# Patient Record
Sex: MALE | Race: WHITE | NOT HISPANIC OR LATINO | Employment: FULL TIME | URBAN - METROPOLITAN AREA
[De-identification: names, ages, dates, MRNs, and addresses within clinical notes are randomized per-mention and may not be internally consistent; named-entity substitution may affect disease eponyms.]

---

## 2018-04-02 ENCOUNTER — HOSPITAL ENCOUNTER (EMERGENCY)
Facility: MEDICAL CENTER | Age: 23
End: 2018-04-02
Attending: EMERGENCY MEDICINE
Payer: OTHER MISCELLANEOUS

## 2018-04-02 VITALS
OXYGEN SATURATION: 100 % | DIASTOLIC BLOOD PRESSURE: 73 MMHG | SYSTOLIC BLOOD PRESSURE: 122 MMHG | BODY MASS INDEX: 29.98 KG/M2 | RESPIRATION RATE: 14 BRPM | HEIGHT: 69 IN | TEMPERATURE: 98.8 F | WEIGHT: 202.38 LBS | HEART RATE: 77 BPM

## 2018-04-02 DIAGNOSIS — L02.91 ABSCESS: ICD-10-CM

## 2018-04-02 PROCEDURE — 99283 EMERGENCY DEPT VISIT LOW MDM: CPT

## 2018-04-02 RX ORDER — DESMOPRESSIN ACETATE 0.2 MG/1
0.2 TABLET ORAL 3 TIMES DAILY
COMMUNITY
End: 2019-06-23

## 2018-04-02 RX ORDER — OLANZAPINE 5 MG/1
20 TABLET ORAL 2 TIMES DAILY
COMMUNITY
End: 2020-08-29

## 2018-04-02 RX ORDER — CLINDAMYCIN HYDROCHLORIDE 300 MG/1
300 CAPSULE ORAL 4 TIMES DAILY
Qty: 28 CAP | Refills: 0 | Status: SHIPPED | OUTPATIENT
Start: 2018-04-02 | End: 2018-04-09

## 2018-04-02 RX ORDER — OXYBUTYNIN CHLORIDE 5 MG/1
2.5 TABLET ORAL DAILY
COMMUNITY
End: 2019-08-21

## 2018-04-02 ASSESSMENT — LIFESTYLE VARIABLES: DO YOU DRINK ALCOHOL: NO

## 2018-04-02 NOTE — ED NOTES
Discharge instructions provided to pt. Copy of instructions and rx x1 provided to pt. Verbalized understanding. Instructed to follow up with PCP or return to ed with worsening symptoms. Educated on worsening symptoms. Educated on hygiene. Educated on pain management. Pt discharged to home. PT ambulated out of ED. No questions or concerns.

## 2018-04-02 NOTE — ED PROVIDER NOTES
"ED Provider Note    CHIEF COMPLAINT  Chief Complaint   Patient presents with   • Lump     under R arm, first noticed it Friday, today it's larger and more painful, not draining anything       HPI  Bert Bravo is a 23 y.o. male who presents for evaluation of a lump under his right arm. He 1st noticed it a couple of days ago. States it's a little bit larger today and more uncomfortable so he is coming in for evaluation. He's had no fevers. Has had no drainage.    REVIEW OF SYSTEMS  See HPI for further details. All other systems are negative.     PAST MEDICAL HISTORY  History reviewed. No pertinent past medical history.    FAMILY HISTORY  History reviewed. No pertinent family history.    SOCIAL HISTORY  Social History     Social History   • Marital status: N/A     Spouse name: N/A   • Number of children: N/A   • Years of education: N/A     Social History Main Topics   • Smoking status: Current Every Day Smoker     Packs/day: 1.00     Types: Cigarettes   • Smokeless tobacco: Never Used   • Alcohol use No   • Drug use: No   • Sexual activity: Not on file     Other Topics Concern   • Not on file     Social History Narrative   • No narrative on file       SURGICAL HISTORY  Past Surgical History:   Procedure Laterality Date   • OTHER ORTHOPEDIC SURGERY  2009    R knee cap       CURRENT MEDICATIONS  Home Medications     Reviewed by Payton Owens R.N. (Registered Nurse) on 04/02/18 at 0809  Med List Status: Complete   Medication Last Dose Status   desmopressin (DDAVP) 0.2 MG tablet 4/2/2018 Active   OLANZapine (ZYPREXA) 5 MG Tab 4/2/2018 Active   oxybutynin (DITROPAN) 5 MG Tab 4/2/2018 Active                ALLERGIES  No Known Allergies    PHYSICAL EXAM  VITAL SIGNS: /66   Pulse 81   Temp 36.6 °C (97.9 °F)   Resp 17   Ht 1.753 m (5' 9\")   Wt 91.8 kg (202 lb 6.1 oz)   SpO2 100%   BMI 29.89 kg/m²     Constitutional: Well developed, Well nourished, No acute distress, Non-toxic appearance.   HENT: " Normocephalic, Atraumatic.   Cardiovascular: Normal heart rate.   Thorax & Lungs: No respiratory distress.  Skin: Warm, Dry.   Musculoskeletal: Right axilla there is a firm erythematous area less than 1 cm in diameter. There is no fluctuance. There is slight tenderness.  Neurologic: Awake alert.    COURSE & MEDICAL DECISION MAKING  Pertinent Labs & Imaging studies reviewed. (See chart for details)  This 23-year-old here for evaluation of a lump in his right axilla. On exam he does have a small area of swelling. This may represent a very small abscess that I do not think this requires drainage and it should respond to oral antibiotics. I will place him on clindamycin. He'll return here for any worsening symptoms. He is given a discharge instruction sheet on abscesses.    FINAL IMPRESSION  1. Right axillary abscess  2.   3.         Electronically signed by: Aaron Bruno, 4/2/2018 8:50 AM

## 2018-04-02 NOTE — DISCHARGE INSTRUCTIONS
Skin Abscess  A skin abscess is an infected area on or under your skin that contains a collection of pus and other material. An abscess may also be called a furuncle, carbuncle, or boil. An abscess can occur in or on almost any part of your body.  Some abscesses break open (rupture) on their own. Most continue to get worse unless they are treated. The infection can spread deeper into the body and eventually into your blood, which can make you feel ill. Treatment usually involves draining the abscess.  What are the causes?  An abscess occurs when germs, often bacteria, pass through your skin and cause an infection. This may be caused by:  · A scrape or cut on your skin.  · A puncture wound through your skin, including a needle injection.  · Blocked oil or sweat glands.  · Blocked and infected hair follicles.  · A cyst that forms beneath your skin (sebaceous cyst) and becomes infected.  What increases the risk?  This condition is more likely to develop in people who:  · Have a weak body defense system (immune system).  · Have diabetes.  · Have dry and irritated skin.  · Get frequent injections or use illegal IV drugs.  · Have a foreign body in a wound, such as a splinter.  · Have problems with their lymph system or veins.  What are the signs or symptoms?  An abscess may start as a painful, firm bump under the skin. Over time, the abscess may get larger or become softer. Pus may appear at the top of the abscess, causing pressure and pain. It may eventually break through the skin and drain. Other symptoms include:  · Redness.  · Warmth.  · Swelling.  · Tenderness.  · A sore on the skin.  How is this diagnosed?  This condition is diagnosed based on your medical history and a physical exam. A sample of pus may be taken from the abscess to find out what is causing the infection and what antibiotics can be used to treat it. You also may have:  · Blood tests to look for signs of infection or spread of an infection to your  blood.  · Imaging studies such as ultrasound, CT scan, or MRI if the abscess is deep.  How is this treated?  Small abscesses that drain on their own may not need treatment. Treatment for an abscess that does not rupture on its own may include:  · Warm compresses applied to the area several times per day.  · Incision and drainage. Your health care provider will make an incision to open the abscess and will remove pus and any foreign body or dead tissue. The incision area may be packed with gauze to keep it open for a few days while it heals.  · Antibiotic medicines to treat infection. For a severe abscess, you may first get antibiotics through an IV and then change to oral antibiotics.  Follow these instructions at home:  Abscess Care  · If you have an abscess that has not drained, place a warm, clean, wet washcloth over the abscess several times a day. Do this as told by your health care provider.  · Follow instructions from your health care provider about how to take care of your abscess. Make sure you:  ¨ Cover the abscess with a bandage (dressing).  ¨ Change your dressing or gauze as told by your health care provider.  ¨ Wash your hands with soap and water before you change the dressing or gauze. If soap and water are not available, use hand .  · Check your abscess every day for signs of a worsening infection. Check for:  ¨ More redness, swelling, or pain.  ¨ More fluid or blood.  ¨ Warmth.  ¨ More pus or a bad smell.  Medicines  · Take over-the-counter and prescription medicines only as told by your health care provider.  · If you were prescribed an antibiotic medicine, take it as told by your health care provider. Do not stop taking the antibiotic even if you start to feel better.  General instructions  · To avoid spreading the infection:  ¨ Do not share personal care items, towels, or hot tubs with others.  ¨ Avoid making skin contact with other people.  · Keep all follow-up visits as told by your  health care provider. This is important.  Contact a health care provider if:  · You have more redness, swelling, or pain around your abscess.  · You have more fluid or blood coming from your abscess.  · Your abscess feels warm to the touch.  · You have more pus or a bad smell coming from your abscess.  · You have a fever.  · You have muscle aches.  · You have chills or a general ill feeling.  Get help right away if:  · You have severe pain.  · You see red streaks on your skin spreading away from the abscess.  This information is not intended to replace advice given to you by your health care provider. Make sure you discuss any questions you have with your health care provider.  Document Released: 09/27/2006 Document Revised: 08/13/2017 Document Reviewed: 10/26/2016  [employer] Interactive Patient Education © 2017 [employer]

## 2018-04-02 NOTE — ED TRIAGE NOTES
Ambulates to triage  Chief Complaint   Patient presents with   • Lump     under R arm, first noticed it Friday, today it's larger and more painful, not draining anything

## 2018-04-02 NOTE — ED NOTES
PT assessment complete. Agree with triage note. Pt c/o right axillary lump for 4 days. Pt has small amount of redness and increasing size/pain. No fevers. PT in gown. Educated on NPO status until cleared by MD. No needs. Will continue to monitor.

## 2018-08-15 ENCOUNTER — HOSPITAL ENCOUNTER (EMERGENCY)
Facility: MEDICAL CENTER | Age: 23
End: 2018-08-15
Attending: EMERGENCY MEDICINE
Payer: OTHER MISCELLANEOUS

## 2018-08-15 VITALS
SYSTOLIC BLOOD PRESSURE: 112 MMHG | BODY MASS INDEX: 28.24 KG/M2 | WEIGHT: 190.7 LBS | HEIGHT: 69 IN | TEMPERATURE: 98.6 F | DIASTOLIC BLOOD PRESSURE: 70 MMHG | HEART RATE: 80 BPM | OXYGEN SATURATION: 98 % | RESPIRATION RATE: 16 BRPM

## 2018-08-15 DIAGNOSIS — S01.81XA FACIAL LACERATION, INITIAL ENCOUNTER: ICD-10-CM

## 2018-08-15 PROCEDURE — 304999 HCHG REPAIR-SIMPLE/INTERMED LEVEL 1

## 2018-08-15 PROCEDURE — 99283 EMERGENCY DEPT VISIT LOW MDM: CPT

## 2018-08-15 PROCEDURE — 700101 HCHG RX REV CODE 250: Performed by: EMERGENCY MEDICINE

## 2018-08-15 PROCEDURE — 303353 HCHG DERMABOND SKIN ADHESIVE

## 2018-08-15 RX ADMIN — TETRACAINE HCL 3 ML: 10 INJECTION SUBARACHNOID at 13:33

## 2018-08-15 ASSESSMENT — PAIN SCALES - GENERAL: PAINLEVEL_OUTOF10: 0

## 2018-08-15 NOTE — ED NOTES
Discharge instructions given to pt including returning for signs and symptoms  Of infections.pt educated on the symptoms of infection. Questions answered by rn.  Discharged w/steady gait and stable vitals.

## 2018-08-15 NOTE — ED PROVIDER NOTES
"ED Provider Note    Scribed for Andrea Maher M.D. by Gabbie José. 8/15/2018, 1:44 PM.    Means of arrival: walk in   History obtained from: patient   History limited by: none     CHIEF COMPLAINT  Chief Complaint   Patient presents with   • T-5000 Lacerations       HPI  Bert Bravo is a 23 y.o. male who presents to the Emergency Department laceration over the left eyebrow.  He was at work on a ladder or something fell striking him on the face and he suffered a laceration came in for evaluation tetanus is up-to-date.    REVIEW OF SYSTEMS  ROS    PAST MEDICAL HISTORY   No pertinent past medical history     SURGICAL HISTORY   has a past surgical history that includes other orthopedic surgery (2009).    SOCIAL HISTORY  Social History   Substance Use Topics   • Smoking status: Current Every Day Smoker     Packs/day: 1.00     Types: Cigarettes   • Smokeless tobacco: Never Used   • Alcohol use No      History   Drug Use No       FAMILY HISTORY  History reviewed. No pertinent family history.    CURRENT MEDICATIONS  Home Medications     Reviewed by Ayanna Cornejo R.N. (Registered Nurse) on 08/15/18 at 1333  Med List Status: Partial   Medication Last Dose Status   desmopressin (DDAVP) 0.2 MG tablet 4/2/2018 Active   OLANZapine (ZYPREXA) 5 MG Tab 4/2/2018 Active   oxybutynin (DITROPAN) 5 MG Tab 4/2/2018 Active                ALLERGIES  No Known Allergies    PHYSICAL EXAM  VITAL SIGNS: /70   Pulse 80   Temp 37 °C (98.6 °F)   Resp 16   Ht 1.753 m (5' 9\")   Wt 86.5 kg (190 lb 11.2 oz)   SpO2 98%   BMI 28.16 kg/m²     Constitutional:   No acute distress  HENT: Patient has a 1 cm laceration just above the lateral aspect of the left eyebrow.  Moist mucous membranes    Laceration Repair Procedure Note    Patient's wound was anesthetized by topical let.  It was then blotted dry and Dermabond without complication    COURSE & MEDICAL DECISION MAKING  Pertinent Labs & Imaging studies reviewed. (See chart for " details)    1:44 PM - Patient seen and examined at bedside. The differential diagnoses include but are not limited to: . Informed patient I will perform a laceration repair for treatment he is agreeable to this.     2:01 PM- Performed laceration repair as shown above without complications. Discussed proper wound care and follow up with the patient. He understands and agrees to be discharged home.         The patient will return for new or worsening symptoms and is stable at the time of discharge.    DISPOSITION:  Patient will be discharged home in stable condition.    FOLLOW UP:  No follow-up provider specified.    FINAL IMPRESSION  1. Facial laceration, initial encounter        Gabbie WOOD (Scribe), am scribing for, and in the presence of, Andrea Maher M.D..    Electronically signed by: Gabbie José (Anne), 8/15/2018    Andrea WOOD M.D. personally performed the services described in this documentation, as scribed by Gabbie José in my presence, and it is both accurate and complete.    The note accurately reflects work and decisions made by me.  Andrea Maher  8/15/2018  7:59 PM

## 2018-08-15 NOTE — ED NOTES
Pt ambulates to room w/steady gait. Agree with triage notes. Bleeding in left eye brow bleeding controlled. Unknown if UTD w/tetanus shot.

## 2018-08-15 NOTE — DISCHARGE INSTRUCTIONS
Stitches, Staples, or Adhesive Wound Closure  Health care providers use stitches (sutures), staples, and certain glue (skin adhesives) to hold skin together while it heals (wound closure). You may need this treatment after you have surgery or if you cut your skin accidentally. These methods help your skin to heal more quickly and make it less likely that you will have a scar. A wound may take several months to heal completely.  The type of wound you have determines when your wound gets closed. In most cases, the wound is closed as soon as possible (primary skin closure). Sometimes, closure is delayed so the wound can be cleaned and allowed to heal naturally. This reduces the chance of infection. Delayed closure may be needed if your wound:  · Is caused by a bite.  · Happened more than 6 hours ago.  · Involves loss of skin or the tissues under the skin.  · Has dirt or debris in it that cannot be removed.  · Is infected.  What are the different kinds of wound closures?  There are many options for wound closure. The one that your health care provider uses depends on how deep and how large your wound is.  Adhesive Glue   To use this type of glue to close a wound, your health care provider holds the edges of the wound together and paints the glue on the surface of your skin. You may need more than one layer of glue. Then the wound may be covered with a light bandage (dressing).  This type of skin closure may be used for small wounds that are not deep (superficial). Using glue for wound closure is less painful than other methods. It does not require a medicine that numbs the area (local anesthetic). This method also leaves nothing to be removed. Adhesive glue is often used for children and on facial wounds.  Adhesive glue cannot be used for wounds that are deep, uneven, or bleeding. It is not used inside of a wound.  Adhesive Strips   These strips are made of sticky (adhesive), porous paper. They are applied across your  skin edges like a regular adhesive bandage. You leave them on until they fall off.  Adhesive strips may be used to close very superficial wounds. They may also be used along with sutures to improve the closure of your skin edges.  Sutures   Sutures are the oldest method of wound closure. Sutures can be made from natural substances, such as silk, or from synthetic materials, such as nylon and steel. They can be made from a material that your body can break down as your wound heals (absorbable), or they can be made from a material that needs to be removed from your skin (nonabsorbable). They come in many different strengths and sizes.  Your health care provider attaches the sutures to a steel needle on one end. Sutures can be passed through your skin, or through the tissues beneath your skin. Then they are tied and cut. Your skin edges may be closed in one continuous stitch or in separate stitches.  Sutures are strong and can be used for all kinds of wounds. Absorbable sutures may be used to close tissues under the skin. The disadvantage of sutures is that they may cause skin reactions that lead to infection. Nonabsorbable sutures need to be removed.  Staples   When surgical staples are used to close a wound, the edges of your skin on both sides of the wound are brought close together. A staple is placed across the wound, and an instrument secures the edges together. Staples are often used to close surgical cuts (incisions).  Staples are faster to use than sutures, and they cause less skin reaction. Staples need to be removed using a tool that bends the staples away from your skin.  How do I care for my wound closure?  · Take medicines only as directed by your health care provider.  · If you were prescribed an antibiotic medicine for your wound, finish it all even if you start to feel better.  · Use ointments or creams only as directed by your health care provider.  · Wash your hands with soap and water before and  after touching your wound.  · Do not soak your wound in water. Do not take baths, swim, or use a hot tub until your health care provider approves.  · Ask your health care provider when you can start showering. Cover your wound if directed by your health care provider.  · Do not take out your own sutures or staples.  · Do not pick at your wound. Picking can cause an infection.  · Keep all follow-up visits as directed by your health care provider. This is important.  How long will I have my wound closure?  · Leave adhesive glue on your skin until the glue peels away.  · Leave adhesive strips on your skin until the strips fall off.  · Absorbable sutures will dissolve within several days.  · Nonabsorbable sutures and staples must be removed. The location of the wound will determine how long they stay in. This can range from several days to a couple of weeks.  When should I seek help for my wound closure?  Contact your health care provider if:  · You have a fever.  · You have chills.  · You have drainage, redness, swelling, or pain at your wound.  · There is a bad smell coming from your wound.  · The skin edges of your wound start to separate after your sutures have been removed.  · Your wound becomes thick, raised, and darker in color after your sutures come out (scarring).  This information is not intended to replace advice given to you by your health care provider. Make sure you discuss any questions you have with your health care provider.  Document Released: 09/12/2002 Document Revised: 08/16/2017 Document Reviewed: 05/27/2015  [employer] Interactive Patient Education © 2017 [employer] Inc.

## 2018-08-15 NOTE — ED TRIAGE NOTES
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

## 2019-05-18 ENCOUNTER — HOSPITAL ENCOUNTER (EMERGENCY)
Facility: MEDICAL CENTER | Age: 24
End: 2019-05-18
Attending: EMERGENCY MEDICINE
Payer: COMMERCIAL

## 2019-05-18 VITALS
SYSTOLIC BLOOD PRESSURE: 114 MMHG | DIASTOLIC BLOOD PRESSURE: 76 MMHG | RESPIRATION RATE: 16 BRPM | HEIGHT: 69 IN | WEIGHT: 202.82 LBS | BODY MASS INDEX: 30.04 KG/M2 | TEMPERATURE: 97.7 F | HEART RATE: 83 BPM | OXYGEN SATURATION: 97 %

## 2019-05-18 DIAGNOSIS — L03.221 CELLULITIS OF NECK: ICD-10-CM

## 2019-05-18 PROCEDURE — 99283 EMERGENCY DEPT VISIT LOW MDM: CPT

## 2019-05-18 RX ORDER — SULFAMETHOXAZOLE AND TRIMETHOPRIM 800; 160 MG/1; MG/1
1 TABLET ORAL EVERY 12 HOURS
Qty: 10 TAB | Refills: 0 | Status: SHIPPED | OUTPATIENT
Start: 2019-05-18 | End: 2019-05-25

## 2019-05-18 RX ORDER — ONDANSETRON 2 MG/ML
4 INJECTION INTRAMUSCULAR; INTRAVENOUS ONCE
Status: DISCONTINUED | OUTPATIENT
Start: 2019-05-18 | End: 2019-05-18

## 2019-05-18 RX ORDER — KETAMINE HYDROCHLORIDE 50 MG/ML
1 INJECTION, SOLUTION INTRAMUSCULAR; INTRAVENOUS ONCE
Status: DISCONTINUED | OUTPATIENT
Start: 2019-05-18 | End: 2019-05-18

## 2019-05-18 RX ORDER — CEPHALEXIN 500 MG/1
500 CAPSULE ORAL 4 TIMES DAILY
Qty: 28 CAP | Refills: 0 | Status: SHIPPED | OUTPATIENT
Start: 2019-05-18 | End: 2019-06-23

## 2019-05-18 ASSESSMENT — LIFESTYLE VARIABLES: DO YOU DRINK ALCOHOL: NO

## 2019-05-18 NOTE — ED TRIAGE NOTES
Pt ambulated to triage with   Chief Complaint   Patient presents with   • Abscess     back of neck, redness noted, first noted last sunday      Pt Informed regarding triage process and verbalized understanding to inform triage tech or RN for any changes in condition. Placed in lobby.

## 2019-05-19 NOTE — ED NOTES
Bert Mora discharged via ambulation with self.  Discharge instructions given and reviewed, patient educated to follow up with [facility], verbalized understanding.  Prescriptions given x 2.  All personal belongings in possession.  No questions at this time.

## 2019-05-19 NOTE — ED PROVIDER NOTES
"ED Provider Note    ED Provider Note      Primary care provider: No primary care provider on file.    CHIEF COMPLAINT  Chief Complaint   Patient presents with   • Abscess     back of neck, redness noted, first noted last sunday       HPI  Bert Braov is a 24 y.o. male who presents to the Emergency Department with chief complaint of abscess.  Patient reported that he felt as though he had an ingrown hair on the back of his neck several days prior he has been putting hot compresses on the area and picking at the area he is had increasing pain redness swelling.  Pain is rated as moderate he states the pain is exacerbated with any movement or manipulation of the area there is no alleviating factors.  He has no headache no altered mental status no measured fever occasional subjective chills no nausea no vomiting no other acute symptoms or concerns.  Pain is currently rated as moderate    REVIEW OF SYSTEMS  10 systems reviewed and otherwise negative, pertinent positives and negatives listed in the history of present illness.    PAST MEDICAL HISTORY   None    SURGICAL HISTORY   has a past surgical history that includes other orthopedic surgery (2009).    SOCIAL HISTORY  Social History   Substance Use Topics   • Smoking status: Current Every Day Smoker     Packs/day: 1.00     Types: Cigarettes   • Smokeless tobacco: Never Used   • Alcohol use No      History   Drug Use No       FAMILY HISTORY  Non-Contributory    CURRENT MEDICATIONS  Home Medications     Reviewed by Kirsten Vyas R.N. (Registered Nurse) on 05/18/19 at 1634  Med List Status: Partial   Medication Last Dose Status   desmopressin (DDAVP) 0.2 MG tablet not taking Active   OLANZapine (ZYPREXA) 5 MG Tab not  taking Active   oxybutynin (DITROPAN) 5 MG Tab 5/18/2019 Active                ALLERGIES  No Known Allergies    PHYSICAL EXAM  VITAL SIGNS: /72   Pulse 91   Temp 36.5 °C (97.7 °F) (Temporal)   Resp 16   Ht 1.753 m (5' 9\")   Wt 92 kg " "(202 lb 13.2 oz)   SpO2 97%   BMI 29.95 kg/m²   Pulse ox interpretation: I interpret this pulse ox as normal.  Constitutional: Alert and oriented x 3, minimal distress  HEENT: Atraumatic normocephalic, pupils are equal round reactive to light extraocular movements are intact. The nares is clear, external ears are normal, mouth shows moist mucous membranes  Neck: Patient has a 2 cm area of erythema and induration at the nape of the neck just at the hairline there is minimal excoriation of the lesion there is no fluctuance there is no drainage.  Cardiovascular: Regular rate and rhythm no murmur rub or gallop 2+ pulses peripherally x4  Thorax & Lungs: No respiratory distress, no wheezes rales or rhonchi, No chest tenderness.   GI: Soft nontender nondistended positive bowel sounds, no peritoneal signs  Skin: Changes of the posterior neck as above otherwise unremarkable  Musculoskeletal: Moving all extremities with full range and 5 of 5 strength, no acute  deformity  Neurologic: Cranial nerves III through XII are grossly intact, no sensory deficit, no cerebellar dysfunction   Psychiatric: Appropriate affect for situation at this time          Medical Decision Making: Patient has small lesion approximately 2 cm neck at the hairline there is no fluctuance there is no drainage there is no evidence of abscess amenable to drainage at this time.  I discussed patient with the patient that he is to return in 12 to 24 hours for increasing pain swelling fevers chills nausea vomiting any other acute symptoms or or concerns otherwise will place on Bactrim and Keflex discharged home in stable condition.  Follow-up with primary care in 1 to 2 days for reevaluation.    /72   Pulse 91   Temp 36.5 °C (97.7 °F) (Temporal)   Resp 16   Ht 1.753 m (5' 9\")   Wt 92 kg (202 lb 13.2 oz)   SpO2 97%   BMI 29.95 kg/m²     [address] [address]  [phone]  Schedule an appointment as soon as " possible for a visit   for establishment of primary care, for blood pressure management    [facility], Emergency Dept  [address] [facility] [address] [phone]    in 12-24 hours if symptoms persist,, immediately if symptoms worsen      Discharge Medication List as of 5/18/2019  6:17 PM      START taking these medications    Details   cephALEXin (KEFLEX) 500 MG Cap Take 1 Cap by mouth 4 times a day., Disp-28 Cap, R-0, Print Rx Paper      sulfamethoxazole-trimethoprim (BACTRIM DS) 800-160 MG tablet Take 1 Tab by mouth every 12 hours for 7 days., Disp-10 Tab, R-0, Print Rx Paper             FINAL IMPRESSION  1. Cellulitis of neck Active          This dictation has been created using voice recognition software and/or scribes. The accuracy of the dictation is limited by the abilities of the software and the expertise of the scribes. I expect there may be some errors of grammar and possibly content. I made every attempt to manually correct the errors within my dictation. However, errors related to voice recognition software and/or scribes may still exist and should be interpreted within the appropriate context.

## 2019-06-23 ENCOUNTER — NON-PROVIDER VISIT (OUTPATIENT)
Dept: OCCUPATIONAL MEDICINE | Facility: CLINIC | Age: 24
End: 2019-06-23

## 2019-06-23 ENCOUNTER — HOSPITAL ENCOUNTER (EMERGENCY)
Facility: MEDICAL CENTER | Age: 24
End: 2019-06-23
Attending: EMERGENCY MEDICINE
Payer: COMMERCIAL

## 2019-06-23 VITALS
SYSTOLIC BLOOD PRESSURE: 119 MMHG | HEART RATE: 90 BPM | DIASTOLIC BLOOD PRESSURE: 78 MMHG | BODY MASS INDEX: 30.82 KG/M2 | OXYGEN SATURATION: 98 % | RESPIRATION RATE: 17 BRPM | TEMPERATURE: 98.2 F | WEIGHT: 208.11 LBS | HEIGHT: 69 IN

## 2019-06-23 DIAGNOSIS — Z02.83 ENCOUNTER FOR DRUG SCREENING: ICD-10-CM

## 2019-06-23 DIAGNOSIS — Z02.1 PRE-EMPLOYMENT DRUG SCREENING: ICD-10-CM

## 2019-06-23 DIAGNOSIS — S61.213A LACERATION OF LEFT MIDDLE FINGER WITHOUT DAMAGE TO NAIL, FOREIGN BODY PRESENCE UNSPECIFIED, INITIAL ENCOUNTER: ICD-10-CM

## 2019-06-23 PROCEDURE — 303485 HCHG DRESSING MEDIUM

## 2019-06-23 PROCEDURE — 82075 ASSAY OF BREATH ETHANOL: CPT | Performed by: PREVENTIVE MEDICINE

## 2019-06-23 PROCEDURE — 99284 EMERGENCY DEPT VISIT MOD MDM: CPT

## 2019-06-23 PROCEDURE — 8899 PR URINE 11 PANEL - AFTER HOURS: Performed by: PREVENTIVE MEDICINE

## 2019-06-23 PROCEDURE — 304999 HCHG REPAIR-SIMPLE/INTERMED LEVEL 1

## 2019-06-23 PROCEDURE — A9270 NON-COVERED ITEM OR SERVICE: HCPCS | Performed by: EMERGENCY MEDICINE

## 2019-06-23 PROCEDURE — 80305 DRUG TEST PRSMV DIR OPT OBS: CPT | Performed by: PREVENTIVE MEDICINE

## 2019-06-23 PROCEDURE — 303747 HCHG EXTRA SUTURE

## 2019-06-23 PROCEDURE — 700102 HCHG RX REV CODE 250 W/ 637 OVERRIDE(OP): Performed by: EMERGENCY MEDICINE

## 2019-06-23 RX ORDER — CEPHALEXIN 500 MG/1
500 CAPSULE ORAL 4 TIMES DAILY
Qty: 40 CAP | Refills: 0 | Status: SHIPPED | OUTPATIENT
Start: 2019-06-23 | End: 2019-08-21

## 2019-06-23 RX ORDER — CEPHALEXIN 250 MG/1
500 CAPSULE ORAL ONCE
Status: COMPLETED | OUTPATIENT
Start: 2019-06-23 | End: 2019-06-23

## 2019-06-23 RX ORDER — IBUPROFEN 800 MG/1
800 TABLET ORAL EVERY 8 HOURS PRN
Qty: 30 TAB | Refills: 0 | Status: SHIPPED | OUTPATIENT
Start: 2019-06-23 | End: 2019-08-21

## 2019-06-23 RX ADMIN — IBUPROFEN 800 MG: 600 TABLET ORAL at 20:03

## 2019-06-23 RX ADMIN — CEPHALEXIN 500 MG: 250 CAPSULE ORAL at 20:04

## 2019-06-23 NOTE — LETTER
"  FORM C-4:  EMPLOYEE’S CLAIM FOR COMPENSATION/ REPORT OF INITIAL TREATMENT  EMPLOYEE’S CLAIM - PROVIDE ALL INFORMATION REQUESTED   First Name  Bert Last Name  Diego Mora Birthdate             Age  1995 24 y.o. Sex  male Claim Number   Home Employee Address  [address]                                     Zip  [address] Height  1.753 m (5' 9\") Weight  94.4 kg (208 lb 1.8 oz) [facility]     Mailing Employee Address                           [address]               Zip  [address] Telephone  [phone] (home)  Primary Language Spoken  ENGLISH   Insurer   Third Party   [employer] Employee's Occupation (Job Title) When Injury or Occupational Disease Occurred     Employer's Name  [employer] Telephone  [phone]    Employer Address  [address] Zip  [address]   Date of Injury  6/23/2019       Hour of Injury  4:30 PM Date Employer Notified  6/23/2019 Last Day of Work after Injury or Occupational Disease  6/23/2019 Supervisor to Whom Injury Reported  Eddi Arizmendi   Address or Location of Accident (if applicable)  [[place]]   What were you doing at the time of accident? (if applicable)  Fixing a machine error    How did this injury or occupational disease occur? Be specific and answer in detail. Use additional sheet if necessary)  A LL cylinder code error occured. I went to pull the jammed battery out form underneith the cylinder & the battery came loose lodging my middle finger under the cylinder shaft and I had to rip my finger outh of the machine   If you believe that you have an occupational disease, when did you first have knowledge of the disability and it relationship to your employment?  n/a Witnesses to the Accident  n/a     Nature of Injury or Occupational Disease  Crushing  Part(s) of Body Injured or Affected  Finger (L), N/A, N/A    "   I certify that the above is true and correct to the best of my knowledge and that I have provided this information in order to obtain the benefits of [place]  I hereby authorize any physician, chiropractor, surgeon, practitioner, or other person, any hospital, including [facility] or [facility], any medical service organization, any insurance company, or other institution or organization to release to each other, any medical or other information, including benefits paid or payable, pertinent to this injury or disease, except information relative to diagnosis, treatment and/or counseling for AIDS, psychological conditions, alcohol or controlled substances, for which I must give specific authorization.  A Photostat of this authorization shall be as valid as the original.   Date  June 23, 2019 [facility] Employee’s Signature   THIS REPORT MUST BE COMPLETED AND MAILED WITHIN 3 WORKING DAYS OF TREATMENT   Place  [facility], EMERGENCY DEPT  Name of Facility   [facility]   Date  6/23/2019 Diagnosis  (S61.213A) Laceration of left middle finger without damage to nail, foreign body presence unspecified, initial encounter Is there evidence the injured employee was under the influence of alcohol and/or another controlled substance at the time of accident?   Hour  8:15 PM Description of Injury or Disease  Laceration of left middle finger without damage to nail, foreign body presence unspecified, initial encounter     Treatment  Patient's finger was cleansed with Betadine solution and saline.  Lidocaine 2% was used for local anesthesia and 5-0 Ethilon was used for laceration repair times 5 interrupted sutures.  Neosporin dressing was applied.  The wound was cleansed thoroughly.  He was given Keflex and ibuprofen in the ER as well as  prescriptions for the same for home.  He is to clean daily with hydrogen peroxide and water, sutures out in 7 to 10 days with Worker's Comp. and return if signs of infection.  He is discharged in stable and improved condition.  Have you advised the patient to remain off work five days or more?         No   X-Ray Findings      If Yes   From Date    To Date      From information given by the employee, together with medical evidence, can you directly connect this injury or occupational disease as job incurred?  Yes If No, is the employee capable of: Full Duty  No Modified Duty  Yes   Is additional medical care by a physician indicated?  Yes If Modified Duty, Specify any Limitations / Restrictions  Light duty for 1 day then he may go back to full duty     Do you know of any previous injury or disease contributing to this condition or occupational disease?  No   Date  6/23/2019 Print Doctor’s Name  Mary Mehta certify the employer’s copy of this form was mailed on:   Address  [address] [id number] [phone] Insurer’s Use Only   [facility]  [address]    Provider’s Tax ID Number  [id number] Telephone  Dept: [phone]    Doctor’s Signature  e-MARY Aguila D.O. Degree  MD    Original - TREATING PHYSICIAN OR CHIROPRACTOR   Pg 2-Insurer/TPA   Pg 3-Employer   Pg 4-Employee                                                                                                  Form C-4 (rev01/03)     BRIEF DESCRIPTION OF RIGHTS AND BENEFITS  (Pursuant to [place] 616[place])    Notice of Injury or Occupational Disease (Incident Report Form C-1): If an injury or occupational disease (OD) arises out of and in the course of employment, you must provide written notice to your employer as soon as practicable, but no later than 7 days after the accident or OD. Your employer shall maintain a sufficient supply of the required forms.    Claim for Compensation (Form C-4): If medical treatment is  "sought, the form C-4 is available at the place of initial treatment. A completed \"Claim for Compensation\" (Form C-4) must be filed within 90 days after an accident or OD. The treating physician or chiropractor must, within 3 working days after treatment, complete and mail to the employer, the employer's insurer and third-party , the Claim for Compensation.    Medical Treatment: If you require medical treatment for your on-the-job injury or OD, you may be required to select a physician or chiropractor from a list provided by your workers’ compensation insurer, if it has contracted with an Organization for Managed Care (MCO) or Preferred Provider Organization (PPO) or providers of health care. If your employer has not entered into a contract with an MCO or PPO, you may select a physician or chiropractor from the Panel of Physicians and Chiropractors. Any medical costs related to your industrial injury or OD will be paid by your insurer.    Temporary Total Disability (TTD): If your doctor has certified that you are unable to work for a period of at least 5 consecutive days, or 5 cumulative days in a 20-day period, or places restrictions on you that your employer does not accommodate, you may be entitled to TTD compensation.    Temporary Partial Disability (TPD): If the wage you receive upon reemployment is less than the compensation for TTD to which you are entitled, the insurer may be required to pay you TPD compensation to make up the difference. TPD can only be paid for a maximum of 24 months.    Permanent Partial Disability (PPD): When your medical condition is stable and there is an indication of a PPD as a result of your injury or OD, within 30 days, your insurer must arrange for an evaluation by a rating physician or chiropractor to determine the degree of your PPD. The amount of your PPD award depends on the date of injury, the results of the PPD evaluation and your age and wage.    Permanent Total " Disability (PTD): If you are medically certified by a treating physician or chiropractor as permanently and totally disabled and have been granted a PTD status by your insurer, you are entitled to receive monthly benefits not to exceed 66 2/3% of your average monthly wage. The amount of your PTD payments is subject to reduction if you previously received a PPD award.    Vocational Rehabilitation Services: You may be eligible for vocational rehabilitation services if you are unable to return to the job due to a permanent physical impairment or permanent restrictions as a result of your injury or occupational disease.    Transportation and Per Bowen Reimbursement: You may be eligible for travel expenses and per bowen associated with medical treatment.  Reopening: You may be able to reopen your claim if your condition worsens after claim closure.    Appeal Process: If you disagree with a written determination issued by the insurer or the insurer does not respond to your request, you may appeal to the Department of Administration, , by following the instructions contained in your determination letter. You must appeal the determination within 70 days from the date of the determination letter at [address], [address], [place] [address], or [address] [facility] [address] [facility] [address], [place] [address]. If you disagree with the  decision, you may appeal to the Department of Administration, . You must file your appeal within 30 days from the date of the  decision letter at [address], [address], [place] [address], or [address], [place] [address]. If you disagree with a decision of an , you may file a petition for judicial review with the District Court. You must do so within 30 days of the Appeal Officer’s decision. You may be represented by an  at your own expense or you may contact  the NA for possible representation.    [place]  for Injured Workers (NAIW): If you disagree with a  decision, you may request that NAIW represent you without charge at an  Hearing. For information regarding denial of benefits, you may contact the NA at: [address], [place] [address], [phone], or [address], [place] [address], [phone]    To File a Complaint with the Division: If you wish to file a complaint with the  of the Division of Industrial Relations (DIR), please contact the Workers’ Compensation Section, [address], [address], [place], [place] [address], telephone [phone], or [address], [address], [place] [address], telephone [phone].    For assistance with Workers’ Compensation Issues: you may contact the Office of the Governor Consumer Health Assistance, [address] [place] [address], [address], [place], [place] [address], Toll Free 1-772.588.1102, Web site: http://govcha.[facility].nv.us, E-mail [email or web address].                                                                                                                                                                               __________________________________________________________________                                    June 23, 2019            Employee Name / Signature                                                                                                                            Date                                       D-2 (rev. 10/07)

## 2019-06-24 NOTE — DISCHARGE INSTRUCTIONS
Clean your finger daily with 50/50 hydrogen peroxide and water solution, pat dry and apply thin layer of Neosporin for the first 3 days only.  After that you will clean daily and keep open to air unless you are at work where he will keep it covered with the bandage.  Stitches come out in 7 to 10 days with Worker's Comp.  Watch closely for increased redness, swelling, drainage or fever greater than 101.  Take the antibiotics until completely gone and ibuprofen as needed.  Light duty tomorrow and then full duty in 2 days.

## 2019-06-24 NOTE — ED PROVIDER NOTES
"CHIEF COMPLAINT  Chief Complaint   Patient presents with   • Laceration     L hand middle finger        HPI  Bert Zaheer Mora is a 24 y.o. male who presents tonight from [employer] where he currently works with a chief complaint of laceration to his left middle finger.  He is right-hand dominant.  He is up-to-date on tetanus.  He has no known drug allergies.  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  Patient put his hand in there to get the battery out in the plate came down on the top of his finger.  He states that he had to pull it out thus causing the laceration.  He denies any distal paresthesias.    REVIEW OF SYSTEMS  See HPI for further details. All other system reviews are negative.    PAST MEDICAL HISTORY  Past Medical History:   Diagnosis Date   • Psychosis (HCC)     mild psychosis  per pt        FAMILY HISTORY  History reviewed. No pertinent family history.    SOCIAL HISTORY  Social History     Social History   • Marital status: Single     Spouse name: N/A   • Number of children: N/A   • Years of education: N/A     Social History Main Topics   • Smoking status: Current Every Day Smoker     Packs/day: 1.00     Types: Cigarettes   • Smokeless tobacco: Never Used   • Alcohol use No   • Drug use: No   • Sexual activity: Not on file     Other Topics Concern   • Not on file     Social History Narrative   • No narrative on file       SURGICAL HISTORY  Past Surgical History:   Procedure Laterality Date   • OTHER ORTHOPEDIC SURGERY  2009    R knee cap       CURRENT MEDICATIONS  none    ALLERGIES  No Known Allergies    PHYSICAL EXAM  VITAL SIGNS: /78   Pulse 99   Temp 36.9 °C (98.5 °F) (Temporal)   Resp 18   Ht 1.753 m (5' 9\")   Wt 94.4 kg (208 lb 1.8 oz)   SpO2 95%   BMI 30.73 kg/m²     Constitutional: Patient is well developed, well nourished in mild " distress from his laceration.  HENT: Normocephalic, atraumatic, Oropharynx moist , nose normal.   Eyes: PERRL, EOMI, Conjunctiva without erythema.  Neck: Supple with  Normal range of motion in flexion, extension and lateral rotation.  Cardiovascular: Normal heart rate and rhythm. No murmur.  Thorax & Lungs: Clear and equal breath sounds with good excursion. No respiratory distress.  Abdomen: Bowel sounds normal in all four quadrants. Soft,nontender.  Skin: Warm, Dry  Back: No cervical, thoracic, or lumbosacral tenderness.  Extremities: Peripheral pulses 4/4 left middle finger reveals a 4 cm flap laceration on the dorsal aspect of the middle phalanx that extends through the dermis and into the fascial plane.  There is no tendon lacerations.  The wound is very dirty with small black dirt fragments within it.  Neurovascular is intact with good capillary refill, good sensation with normal range of motion of all the joints.  There is no other signs of trauma noted to the hand.  Neurologic: Alert & oriented x 3, Normal motor function, Normal sensory function  Psychiatric: Affect normal, Judgment normal, Mood normal.       COURSE & MEDICAL DECISION MAKING  Pertinent Labs & Imaging studies reviewed. (See chart for details)  Procedure note: Patient's wound was cleansed with Betadine solution.  Lidocaine 2% was used for local anesthesia.  Patient's wound was scrubbed and cleansed until all of the small dirt fragment foreign bodies were removed.  There was active bleeding.  The wound was repaired with 5-0 Ethilon times 5 interrupted sutures.  Hemostasis was obtained the patient tolerated procedure well.  Neosporin and sterile dressing was applied.  He was given Keflex and ibuprofen here and will be sent home with prescriptions for the same.  He is to have the sutures out in 7 to 10 days with Worker's Comp., light duty tomorrow than full duty after that.  He is to watch closely for signs of infection, ibuprofen for pain and  antibiotics until completely gone.  He is discharged in stable and improved condition.    FINAL IMPRESSION  1.  4 cm left middle finger laceration  2.   3.         Electronically signed by: Mary Mehta, 6/23/2019 8:01 PMED Provider Note

## 2019-06-24 NOTE — ED NOTES
.Pt D/C to home. VSS. IV removed. D/C instructions, work note, and 2 prescriptions given to patient. Pt verbalizes understanding. Pt leaves ED with no acute changes, complaints or concerns. Pt ambulated out with a steady gait and all belongings.

## 2019-06-24 NOTE — ED TRIAGE NOTES
Pt comes in c/o lac to L middle finger  S/p injury at work   Finger was caught in a cylinder smashing it  Pt pulled finger out cutting it

## 2019-07-02 LAB
AMP AMPHETAMINE: NORMAL
BAR BARBITURATES: NORMAL
BREATH ALCOHOL COMMENT: NORMAL
BZO BENZODIAZEPINES: NORMAL
COC COCAINE: NORMAL
INT CON NEG: NORMAL
INT CON POS: NORMAL
MDMA ECSTASY: NORMAL
MET METHAMPHETAMINES: NORMAL
MTD METHADONE: NORMAL
OPI OPIATES: NORMAL
OXY OXYCODONE: NORMAL
PCP PHENCYCLIDINE: NORMAL
POC BREATHALIZER: 0 PERCENT (ref 0–0.01)
POC URINE DRUG SCREEN OCDRS: NEGATIVE
THC: NORMAL

## 2019-07-27 ENCOUNTER — HOSPITAL ENCOUNTER (EMERGENCY)
Facility: MEDICAL CENTER | Age: 24
End: 2019-07-27
Attending: EMERGENCY MEDICINE
Payer: COMMERCIAL

## 2019-07-27 VITALS
TEMPERATURE: 98.2 F | RESPIRATION RATE: 16 BRPM | BODY MASS INDEX: 29.49 KG/M2 | HEART RATE: 97 BPM | OXYGEN SATURATION: 99 % | HEIGHT: 69 IN | DIASTOLIC BLOOD PRESSURE: 70 MMHG | WEIGHT: 199.08 LBS | SYSTOLIC BLOOD PRESSURE: 116 MMHG

## 2019-07-27 DIAGNOSIS — K29.01 OTHER ACUTE GASTRITIS WITH HEMORRHAGE: ICD-10-CM

## 2019-07-27 PROCEDURE — 99284 EMERGENCY DEPT VISIT MOD MDM: CPT

## 2019-07-27 RX ORDER — PANTOPRAZOLE SODIUM 40 MG/1
40 FOR SUSPENSION ORAL DAILY
Qty: 20 EACH | Refills: 1 | Status: SHIPPED | OUTPATIENT
Start: 2019-07-27 | End: 2019-07-27

## 2019-07-27 RX ORDER — PANTOPRAZOLE SODIUM 40 MG/1
40 FOR SUSPENSION ORAL DAILY
Qty: 20 EACH | Refills: 1 | Status: SHIPPED | OUTPATIENT
Start: 2019-07-27 | End: 2019-08-21

## 2019-07-27 NOTE — ED TRIAGE NOTES
"Pt ambulatory to triage c/o abdominal pain since last night and then vomited x 1 today with \"small amount\" of bright red blood. Pt states he is feeling better but wanted to still get checked out \"I have a hx of ulcers and i'm pretty sure that's what this is but just want to be sure\" nad. vss  "

## 2019-07-27 NOTE — ED PROVIDER NOTES
ED Provider Note    Scribed for Jing Raymundo M.D. by Joe Acuna. 7/27/2019  4:25 PM    Primary care provider: None noted  Means of arrival: Walk in  History obtained from: Patient  History limited by: None    CHIEF COMPLAINT  Chief Complaint   Patient presents with   • Abdominal Pain       HPI  Bert Mora is a 24 y.o. male who presents to the Emergency Department for evaluation after having one episode of vomiting that contained a small amount of blood earlier today. Patient states the blood was bright red in color. He reports vomiting blood when he was younger due to digestive problems, however, it has been 20 years since he had those issues. States he had a couple of beers last night. Patient states he was here a month ago for finger pain and has been taking 4 Motrin per day for this. He stopped taking the motrin a couple of days ago. In contrast to the triage note, patient denies any abdominal pain. No melena, hematochezia, dizziness, or lightheadedness. States he is on psychiatric medication.    REVIEW OF SYSTEMS  GI: Episode of vomiting with blood. No abdominal pain, melena, hematochezia  Neuro: no dizziness or lightheadedness  See history of present illness.     PAST MEDICAL HISTORY   has a past medical history of Psychosis (HCC).    SURGICAL HISTORY   has a past surgical history that includes other orthopedic surgery (2009).    SOCIAL HISTORY  Social History   Substance Use Topics   • Smoking status: Current Every Day Smoker     Packs/day: 1.00     Types: Cigarettes   • Smokeless tobacco: Never Used   • Alcohol use No      History   Drug Use No       FAMILY HISTORY  History reviewed. No pertinent family history.    CURRENT MEDICATIONS  Home Medications     Reviewed by Smiley Morrissey R.N. (Registered Nurse) on 07/27/19 at 1539  Med List Status: Partial   Medication Last Dose Status   cephALEXin (KEFLEX) 500 MG Cap  Active   Cimetidine (TAGAMET PO)  Active   ibuprofen (MOTRIN)  "800 MG Tab  Active   OLANZapine (ZYPREXA) 5 MG Tab  Active   oxybutynin (DITROPAN) 5 MG Tab  Active   SERTRALINE HCL PO  Active                ALLERGIES  No Known Allergies    PHYSICAL EXAM  VITAL SIGNS: /70   Pulse 97   Temp 36.8 °C (98.2 °F) (Temporal)   Resp 16   Ht 1.753 m (5' 9\")   Wt 90.3 kg (199 lb 1.2 oz)   SpO2 99%   BMI 29.40 kg/m²   Constitutional: Well developed, Well nourished, No acute distress, Non-toxic appearance.   HEENT: Normocephalic, Atraumatic,  external ears normal, pharynx pink,  Mucous  Membranes moist, No rhinorrhea or mucosal edema  Eyes: PERRL, EOMI, Conjunctiva normal, No discharge.   Neck: Normal range of motion, No tenderness, Supple, No stridor.   Lymphatic: No lymphadenopathy    Cardiovascular: Regular Rate and Rhythm, No murmurs,  rubs, or gallops.   Thorax & Lungs: Lungs clear to auscultation bilaterally, No respiratory distress, No wheezes, rhales or rhonchi, No chest wall tenderness.   Abdomen: Bowel sounds normal, Soft, non tender, non distended,  No pulsatile masses., no rebound guarding or peritoneal signs.   Skin: Warm, Dry, No erythema, No rash,   Extremities: Equal, intact distal pulses, No cyanosis, No tenderness.   Musculoskeletal: Good range of motion in all major joints. No tenderness to palpation or major deformities noted.   Neurologic: Alert & awake, no focal deficits  Psychiatric: Affect normal    COURSE & MEDICAL DECISION MAKING  Nursing notes, VS, PMSFHx reviewed in chart.     4:25 PM - Patient seen and examined at bedside. Informed patient that his recent use of motrin may be a cause to his symptoms. Informed him there are no interventions indicated at this time. Differential diagnoses include but not limited to: gastritis, ulcers, hannah jerez tear. The patient will be discharged with instructions regarding supportive care and medications. Prescribing protonix. Instructions were given for follow-up with a PCP. Discussed indications for seeking " immediate medical attention including lightheadedness. Patient was given the opportunity for questions. The patient understands and agrees.      The patient will return for new or worsening symptoms and is stable at the time of discharge.    The patient is referred to a primary physician for blood pressure management, diabetic screening, and for all other preventative health concerns.    DISPOSITION:  Patient will be discharged home in stable condition.    FOLLOW UP:  [facility]  [address] [facility] [address] [phone]  Call in 1 day  to establish care, [facility]    [facility], Emergency Dept  [address] [facility] [address] [phone]    As needed, If symptoms worsen      OUTPATIENT MEDICATIONS:  Discharge Medication List as of 7/27/2019  4:32 PM       Pantoprazole (PROTONIX) 40 MG Pack    FINAL IMPRESSION  1. Other acute gastritis with hemorrhage          Joe WOOD (Scribe), am scribing for, and in the presence of, Jing Raymundo M.D..    Electronically signed by: Joe Acuna (Scribe), 7/27/2019    IJing M.D. personally performed the services described in this documentation, as scribed by Joe Acuna in my presence, and it is both accurate and complete. E    The note accurately reflects work and decisions made by me.  Jing Raymundo  7/27/2019  5:13 PM

## 2019-07-28 ENCOUNTER — PATIENT OUTREACH (OUTPATIENT)
Dept: HEALTH INFORMATION MANAGEMENT | Facility: OTHER | Age: 24
End: 2019-07-28

## 2019-08-21 ENCOUNTER — HOSPITAL ENCOUNTER (EMERGENCY)
Facility: MEDICAL CENTER | Age: 24
End: 2019-08-21
Attending: EMERGENCY MEDICINE
Payer: COMMERCIAL

## 2019-08-21 VITALS
TEMPERATURE: 98.2 F | HEIGHT: 69 IN | WEIGHT: 188.27 LBS | HEART RATE: 65 BPM | OXYGEN SATURATION: 97 % | BODY MASS INDEX: 27.89 KG/M2 | DIASTOLIC BLOOD PRESSURE: 76 MMHG | RESPIRATION RATE: 16 BRPM | SYSTOLIC BLOOD PRESSURE: 118 MMHG

## 2019-08-21 DIAGNOSIS — F51.04 PSYCHOPHYSIOLOGICAL INSOMNIA: ICD-10-CM

## 2019-08-21 DIAGNOSIS — F20.0 ACUTE EXACERBATION OF CHRONIC PARANOID SCHIZOPHRENIA (HCC): ICD-10-CM

## 2019-08-21 DIAGNOSIS — T50.905A ADVERSE EFFECT OF DRUG, INITIAL ENCOUNTER: ICD-10-CM

## 2019-08-21 PROCEDURE — 99283 EMERGENCY DEPT VISIT LOW MDM: CPT

## 2019-08-21 PROCEDURE — A9270 NON-COVERED ITEM OR SERVICE: HCPCS | Performed by: EMERGENCY MEDICINE

## 2019-08-21 PROCEDURE — 700102 HCHG RX REV CODE 250 W/ 637 OVERRIDE(OP): Performed by: EMERGENCY MEDICINE

## 2019-08-21 RX ORDER — CLONAZEPAM 1 MG/1
1 TABLET ORAL NIGHTLY PRN
Qty: 10 TAB | Refills: 0 | Status: SHIPPED | OUTPATIENT
Start: 2019-08-21 | End: 2019-08-31

## 2019-08-21 RX ORDER — CLONAZEPAM 1 MG/1
1 TABLET ORAL ONCE
Status: COMPLETED | OUTPATIENT
Start: 2019-08-21 | End: 2019-08-21

## 2019-08-21 RX ADMIN — CLONAZEPAM 1 MG: 1 TABLET ORAL at 20:20

## 2019-08-22 NOTE — ED PROVIDER NOTES
ED Provider Note     Scribed for Mary Mehta D.O. by Brian Fishman. 8/21/2019, 6:48 PM.     Primary care provider: None noted.   Means of arrival: Walk-in         History obtained from: Patient  History limited by: None    CHIEF COMPLAINT  Chief Complaint   Patient presents with   • Other     Pt has been having increased auditory hallucinations, voices calling his name and whispering. C/o increased paranoia, loss of appetite, night sweats. Pt would like to change his sertraline medication. Denies SI/HI.        HPI  Bert Zaheer Mora is a 24 y.o. male who presents to the emergency Department with acute, intermittent auditory/visual hallucinations onset 1 month ago. Patient states that he was diagnosed with mild, drug induced paranoid schizophrenia at 19 and has a history of drug abuse. He has taken Zyprexa for past 5 years and Zoloft for the last 2. Patient states that for the last month he has had hallucinations at night, depressed moods, and increased paranoid anxiety.  He has not been able to sleep for about 3 weeks and is having trouble concentrating at work.  He believes it is due to his Zoloft and would like a medications change. Denies any associated SI or HI. There are no known alleviating or exacerbating factors. Patient is otherwise healthy and has no other complaints. He is scheduled to see his Psychiatrist, PUMA Rivera on the 4th.     REVIEW OF SYSTEMS  Pertinent positives include auditory and visual hallucinations, depressed moods, increased paranoid anxiety. Pertinent negatives include no SI or HI.   See HPI for further details. All other systems are negative.    PAST MEDICAL HISTORY  Past Medical History:   Diagnosis Date   • Psychosis (HCC)     mild psychosis  per pt        FAMILY HISTORY  No family history on file.    SOCIAL HISTORY  Social History     Tobacco Use   • Smoking status: Current Every Day Smoker     Packs/day: 1.00     Types: Cigarettes   • Smokeless tobacco: Never  "Used   Substance Use Topics   • Alcohol use: No   • Drug use: No      Social History     Substance and Sexual Activity   Drug Use No       SURGICAL HISTORY  Past Surgical History:   Procedure Laterality Date   • OTHER ORTHOPEDIC SURGERY  2009    R knee cap       CURRENT MEDICATIONS  No current facility-administered medications for this encounter.     Current Outpatient Medications:   •  SERTRALINE HCL PO, Take 150 mg by mouth every day., Disp: , Rfl:   •  OLANZapine (ZYPREXA) 5 MG Tab, Take 20 mg by mouth 2 Times a Day., Disp: , Rfl:     ALLERGIES  No Known Allergies    PHYSICAL EXAM  VITAL SIGNS: /75   Pulse 67   Temp 36.8 °C (98.2 °F) (Temporal)   Resp 16   Ht 1.753 m (5' 9\")   Wt 85.4 kg (188 lb 4.4 oz)   SpO2 97%   BMI 27.80 kg/m²   Constitutional: Patient is well developed, well nourished. Non-toxic appearing. No acute distress.   HENT: Normocephalic, atraumatic. Nose normal. Oropharynx moist  Eyes: PERRL, EOMI, Conjunctiva without erythema or exudates.   Neck: Supple with no cervical adenopathy. Normal range of motion in flexion, extension and lateral rotation.  Lymphatic: No lymphadenopathy noted.   Cardiovascular: Normal heart rate and Regular rhythm. No murmur.  Thorax & Lungs: Clear and equal breath sounds with good excursion. No respiratory distress  Abdomen: Bowel sounds normal in all four quadrants. Soft,nontender  Skin: Warm, Dry, No erythema, No rashes.   Extremities: Peripheral pulses 4/4    Musculoskeletal: Normal range of motion in all major joints. No tenderness to palpation.  Neurologic: Alert & oriented x 3, Normal motor function, Normal sensory function  Psychiatric: Affect normal, Judgment normal, Mood normal.     DIAGNOSTICS/PROCEDURES    COURSE & MEDICAL DECISION MAKING  Pertinent Labs & Imaging studies reviewed. (See chart for details)    6:48 PM - Patient seen and evaluated at bedside. I informed that patient that I will try to get in contact with psychiatry in regards to his " medications change request. Patient will be treated with Klonopin 1 mg for his symptoms.    8:01 PM - I discussed the patient's case and the above findings with Greg (RN) with life skills who recommends that I prescribe the patient clonopin for his sleep.     8:35 PM - Patient was reevaluated at bedside. I informed the patient that I would write them a prescription for clonopin and recommended he follow up with his psychiatrist. I outlined my plan to discharge them at this time, and the patient was understanding and agreeable to discharge.        I reviewed prescription monitoring program for patient's narcotic use before prescribing a scheduled drug.The patient will not drink alcohol nor drive with prescribed medications. The patient will return for new or worsening symptoms and is stable at the time of discharge.    The patient is referred to a primary physician for blood pressure management, diabetic screening, and for all other preventative health concerns.    In prescribing controlled substances to this patient, I certify that I have obtained and reviewed the medical history of Bert Mora. I have also made a good marcos effort to obtain applicable records from other providers who have treated the patient and there is nothing to indicate per the records the patient has any risk of substance abuse at this time.    I have conducted a physical exam and documented it. I have reviewed Mr. Diego Mora’s prescription history as maintained by the [place] Prescription Monitoring Program.     I have assessed the patient’s risk for abuse, dependency, and addiction using the validated Opioid Risk Tool available at https://www.mdcalc.com/urmxhd-ufyp-fcso-ort-narcotic-abuse.     Given the above, I believe the benefits of controlled substance therapy outweigh the risks. The reasons for prescribing controlled substances include insomnia and inability to take any other medications.  Accordingly, I have  discussed the risk and benefits, treatment plan, and alternative therapies with the patient.   `    DISPOSITION:  Patient will be discharged home in stable condition.    FOLLOW UP:  No follow-up provider specified.    OUTPATIENT MEDICATIONS:  Klonopin 1 mg tablets #10 at at bedtime as needed insomnia    FINAL IMPRESSION  Insomnia  History of paranoid schizophrenia  History of drug abuse     Brian WOOD (Scribe), am scribing for, and in the presence of, Mary Mehta D.O..    Electronically signed by: Brian Fishman (Scribe), 8/21/2019    Mary WOOD D.O. personally performed the services described in this documentation, as scribed by Brian Fishman in my presence, and it is both accurate and complete.    E.    The note accurately reflects work and decisions made by me.  Mary Mehta  8/22/2019  1:02 AM

## 2019-08-22 NOTE — ED TRIAGE NOTES
"Chief Complaint   Patient presents with   • Other     Pt has been having increased auditory hallucinations, voices calling his name and whispering. C/o increased paranoia, loss of appetite, night sweats. Pt would like to change his sertraline medication. Denies SI/HI.      /75   Pulse 67   Temp 36.8 °C (98.2 °F) (Temporal)   Resp 16   Ht 1.753 m (5' 9\")   Wt 85.4 kg (188 lb 4.4 oz)   SpO2 97%   BMI 27.80 kg/m²     Pt ambulated into triage, VS as above, NAD, encouraged to return to the triage nurse or tech with any new complaints or symptoms.  "

## 2019-08-22 NOTE — DISCHARGE INSTRUCTIONS
Reduce your sertraline to 100 mg a day but not more than that.  Call tomorrow to see if you can get in sooner with psychiatry  Take the Klonopin at night so you can sleep.  Do not take more than the prescribed dosage as it is very addictive.  Return if any suicidal or homicidal ideations.

## 2019-08-31 ENCOUNTER — OFFICE VISIT (OUTPATIENT)
Dept: URGENT CARE | Facility: PHYSICIAN GROUP | Age: 24
End: 2019-08-31
Payer: COMMERCIAL

## 2019-08-31 VITALS
HEART RATE: 94 BPM | RESPIRATION RATE: 16 BRPM | BODY MASS INDEX: 27.76 KG/M2 | TEMPERATURE: 98.1 F | OXYGEN SATURATION: 96 % | WEIGHT: 188 LBS

## 2019-08-31 DIAGNOSIS — L02.411 ABSCESS OF AXILLA, RIGHT: ICD-10-CM

## 2019-08-31 DIAGNOSIS — M79.621 PAIN IN RIGHT AXILLA: ICD-10-CM

## 2019-08-31 PROCEDURE — 10060 I&D ABSCESS SIMPLE/SINGLE: CPT | Performed by: NURSE PRACTITIONER

## 2019-08-31 RX ORDER — OXYBUTYNIN CHLORIDE 5 MG/1
5 TABLET ORAL 3 TIMES DAILY
COMMUNITY

## 2019-08-31 RX ORDER — CLINDAMYCIN HYDROCHLORIDE 300 MG/1
300 CAPSULE ORAL 3 TIMES DAILY
Qty: 21 CAP | Refills: 0 | Status: SHIPPED | OUTPATIENT
Start: 2019-08-31 | End: 2019-09-07

## 2019-08-31 ASSESSMENT — ENCOUNTER SYMPTOMS: FEVER: 0

## 2019-08-31 ASSESSMENT — PAIN SCALES - GENERAL: PAINLEVEL: 8=MODERATE-SEVERE PAIN

## 2019-08-31 NOTE — PROGRESS NOTES
Subjective:      Bert Mora is a 24 y.o. male who presents with Abscess (R armpit/ pain )            Cyst   This is a new problem. Episode onset: pt reports formation of a cyst to his right armpit about 2 weeks ago. He states the pain and swelling are getting worse. Denies any drainage. The problem occurs constantly. The problem has been gradually worsening. Pertinent negatives include no fever. He has tried NSAIDs for the symptoms. The treatment provided no relief.       Review of Systems   Constitutional: Negative for fever.   Skin:        Cyst right armpit   All other systems reviewed and are negative.    Past Medical History:   Diagnosis Date   • Psychosis (HCC)     mild psychosis  per pt       Past Surgical History:   Procedure Laterality Date   • OTHER ORTHOPEDIC SURGERY  2009    R knee cap      Social History     Socioeconomic History   • Marital status: Single     Spouse name: Not on file   • Number of children: Not on file   • Years of education: Not on file   • Highest education level: Not on file   Occupational History   • Not on file   Social Needs   • Financial resource strain: Not on file   • Food insecurity:     Worry: Not on file     Inability: Not on file   • Transportation needs:     Medical: Not on file     Non-medical: Not on file   Tobacco Use   • Smoking status: Current Every Day Smoker     Packs/day: 1.00     Types: Cigarettes   • Smokeless tobacco: Never Used   Substance and Sexual Activity   • Alcohol use: No   • Drug use: No   • Sexual activity: Not on file   Lifestyle   • Physical activity:     Days per week: Not on file     Minutes per session: Not on file   • Stress: Not on file   Relationships   • Social connections:     Talks on phone: Not on file     Gets together: Not on file     Attends [employer] service: Not on file     Active member of club or organization: Not on file     Attends meetings of clubs or organizations: Not on file     Relationship status: Not on file    • Intimate partner violence:     Fear of current or ex partner: Not on file     Emotionally abused: Not on file     Physically abused: Not on file     Forced sexual activity: Not on file   Other Topics Concern   • Not on file   Social History Narrative   • Not on file          Objective:     Pulse 94   Temp 36.7 °C (98.1 °F) (Temporal)   Resp 16   Wt 85.3 kg (188 lb)   SpO2 96%   BMI 27.76 kg/m²      Physical Exam   Constitutional: He is oriented to person, place, and time. Vital signs are normal. He appears well-developed and well-nourished.   HENT:   Head: Normocephalic and atraumatic.   Eyes: Pupils are equal, round, and reactive to light. EOM are normal.   Neck: Normal range of motion.   Cardiovascular: Normal rate and regular rhythm.   Pulmonary/Chest: Effort normal.   Musculoskeletal: Normal range of motion.        Right upper arm: He exhibits tenderness.        Arms:  Neurological: He is alert and oriented to person, place, and time.   Skin: Skin is warm and dry. Capillary refill takes less than 2 seconds.   Psychiatric: He has a normal mood and affect. His speech is normal and behavior is normal. Thought content normal.   Vitals reviewed.         Procedure: Incision and Drainage  -Risks, benefits, and alternatives discussed. Risks include infection, bleeding, nerve damage, and poor cosmetic outcome  -Clean technique with sterile instruments  -Local anesthesia with 2% lidocaine with epinephrine  -Incision with #11 blade into fluctuant area with only blood expressed  -Minimal bleeding with good hemostasis achieved  -The patient tolerated the procedure well         Assessment/Plan:     1. Pain in right axilla    2. Abscess of axilla, right  - clindamycin (CLEOCIN) 300 MG Cap; Take 1 Cap by mouth 3 times a day for 7 days.  Dispense: 21 Cap; Refill: 0    Discussed with patient to start taking ABX and this will likely help to pocket of abscess to form   He may need to return in a few days for another I&D,  he understands  Warm compresses several times per day to area  Tylenol and ibuprofen as needed for pain  Take full course of abx  Work note provided  Supportive care, differential diagnoses, and indications for immediate follow-up discussed with patient.    Pathogenesis of diagnosis discussed including typical length and natural progression.      Instructed to return to  or nearest emergency department if symptoms fail to improve, for any change in condition, further concerns, or new concerning symptoms.  Patient states understanding of the plan of care and discharge instructions.

## 2019-08-31 NOTE — LETTER
August 31, 2019         Patient: Bert Mora   YOB: 1995   Date of Visit: 8/31/2019           To Whom it May Concern:    Bert Mora was seen in my clinic on 8/31/2019. Please excuse him from work 8/31/19-9/1/19.        Sincerely,           TOM Pendleton.  Electronically Signed

## 2019-09-09 ENCOUNTER — HOSPITAL ENCOUNTER (EMERGENCY)
Facility: MEDICAL CENTER | Age: 24
End: 2019-09-09
Attending: EMERGENCY MEDICINE
Payer: COMMERCIAL

## 2019-09-09 VITALS
WEIGHT: 180 LBS | RESPIRATION RATE: 14 BRPM | HEART RATE: 65 BPM | SYSTOLIC BLOOD PRESSURE: 110 MMHG | BODY MASS INDEX: 26.66 KG/M2 | OXYGEN SATURATION: 96 % | TEMPERATURE: 96.6 F | DIASTOLIC BLOOD PRESSURE: 68 MMHG | HEIGHT: 69 IN

## 2019-09-09 DIAGNOSIS — F99 INSOMNIA DUE TO OTHER MENTAL DISORDER: ICD-10-CM

## 2019-09-09 DIAGNOSIS — Z76.0 MEDICATION REFILL: ICD-10-CM

## 2019-09-09 DIAGNOSIS — F51.05 INSOMNIA DUE TO OTHER MENTAL DISORDER: ICD-10-CM

## 2019-09-09 PROCEDURE — 99283 EMERGENCY DEPT VISIT LOW MDM: CPT

## 2019-09-09 RX ORDER — CLONAZEPAM 1 MG/1
1 TABLET ORAL NIGHTLY PRN
Qty: 10 TAB | Refills: 0 | Status: SHIPPED | OUTPATIENT
Start: 2019-09-09 | End: 2019-09-19

## 2019-09-10 NOTE — ED PROVIDER NOTES
ED Provider Note    Scribed for Adriel Hernandez M.D. by Dennis Monzon. 9/9/2019, 9:23 PM.    Primary care provider: None noted  Means of arrival: Walk-in  History obtained from: patient  History limited by: none    CHIEF COMPLAINT  Chief Complaint   Patient presents with   • Insomnia   • Medication Refill       HPI  Bert Mora is a 24 y.o. male with a history of schizoaffective disorder who presents to the Emergency Department complaining of insomnia onset since Thursday 9/5/2019. The patient reports that during his last visit to the ED on 8/21/2019 he was experiencing auditory hallucinations and difficulty sleeping. He was given a prescription for Klonopin 1 mg upon discharge and notes that this prescription alleviated his insomnia. Since that visit, he has run out of his Klonopin 10 mg prescription and began experiencing persistent insomnia again and states that he has missed work because of his inability to sleep. He notes only sleeping about 1-2 hours per night. He reports a desire for an additional Klonopin script which prompted his visit to the ED. He denies any associated auditory or visual hallucinations, suicidal ideations, homicidal ideations, fevers, chills, nausea, or vomiting. The patient notes that he has previously been prescribed Klonopin but stopped this prescription because a 15 month rehab program would not allow him to have any narcotic prescriptions. He went to this program for cocaine abuse and notes that he has been 2 years sober. The patient sees a psychiatrist at the [facility] and has an appointment on 9/18/2019. He also takes olanzapine 5 mg and Sertraline 150 mg for regular medications.    REVIEW OF SYSTEMS  Pertinent positives include insomnia. Pertinent negatives include auditory hallucinations, visual hallucinations, suicidal ideations, homicidal ideations, fevers, chills, nausea, vomiting.    PAST MEDICAL HISTORY   has a past medical history of  "Psychosis (HCC).    SURGICAL HISTORY   has a past surgical history that includes other orthopedic surgery (2009).    SOCIAL HISTORY  Social History     Tobacco Use   • Smoking status: Current Every Day Smoker     Packs/day: 1.00     Types: Cigarettes   • Smokeless tobacco: Never Used   Substance Use Topics   • Alcohol use: No   • Drug use: No      Social History     Substance and Sexual Activity   Drug Use No       FAMILY HISTORY  History reviewed. No pertinent family history.    CURRENT MEDICATIONS  No current facility-administered medications for this encounter.     Current Outpatient Medications:   •  clonazePAM (KLONOPIN) 1 MG Tab, Take 1 Tab by mouth at bedtime as needed for up to 10 days., Disp: 10 Tab, Rfl: 0  •  oxybutynin (DITROPAN) 5 MG Tab, Take 5 mg by mouth 3 times a day., Disp: , Rfl:   •  RANITIDINE HCL PO, Take  by mouth., Disp: , Rfl:   •  SERTRALINE HCL PO, Take 150 mg by mouth every day., Disp: , Rfl:   •  OLANZapine (ZYPREXA) 5 MG Tab, Take 20 mg by mouth 2 Times a Day., Disp: , Rfl:     ALLERGIES  No Known Allergies    PHYSICAL EXAM  VITAL SIGNS: /73   Pulse 77   Temp 36.1 °C (97 °F) (Temporal)   Resp 16   Ht 1.753 m (5' 9\")   Wt 81.6 kg (180 lb)   SpO2 98%   BMI 26.58 kg/m²     Constitutional: Well developed, Well nourished, no distress.   Eyes: Conjunctiva normal, No discharge.   Cardiovascular: Normal heart rate, Normal rhythm, No murmurs, equal pulses.   Pulmonary: Normal breath sounds, No respiratory distress, No wheezing, No rales, No rhonchi.  Abdomen:Soft, No tenderness, No masses, no rebound, no guarding.   Musculoskeletal: No major deformities noted, No tenderness.   Skin: Warm, Dry, No erythema, No rash.   Neurologic: Alert & oriented x 3, Normal motor function,  No focal deficits noted.   Psychiatric: Denies any suicidal or homicidal ideations. No auditory or visual hallucinations. Affect normal, Judgment normal, Mood normal.     COURSE & MEDICAL DECISION " MAKING  Pertinent Labs & Imaging studies reviewed. (See chart for details)    9:23 PM - Patient seen and examined at bedside. The differential diagnoses include but are not limited to: Schizoaffective disorder, insomnia. I discussed that I am comfortable prescribing the patient with an additional temporary prescription for Klonopin 1 mg. He may be safely discharged home at this time and should follow up with his psychiatrist at the [facility] as he discussed.      Medical Decision Making: At this point time I think the patient does need some Klonopin to help with his insomnia.  I believe this is likely secondary to his mental illness.  Patient may need adjustment of his medications from his psychiatrist I will give him a 10-day supply to get him through until his next appointment.    I reviewed prescription monitoring program for patient's narcotic use before prescribing a scheduled drug.The patient will not drink alcohol nor drive with prescribed medications. The patient will return for new or worsening symptoms and is stable at the time of discharge.    In prescribing controlled substances to this patient, I certify that I have obtained and reviewed the medical history of Bert Schwab Diego Mora. I have also made a good marcos effort to obtain applicable records from other providers who have treated the patient and records did not demonstrate any increased risk of substance abuse that would prevent me from prescribing controlled substances.     I have conducted a physical exam and documented it. I have reviewed Mr. Diego Mora’s prescription history as maintained by the [place] Prescription Monitoring Program.       Given the above, I believe the benefits of controlled substance therapy outweigh the risks. The reasons for prescribing controlled substances include in my professional opinion, controlled substances are the only reasonable choice for this patient because Of his mental illness.  Accordingly, I have discussed the risk and benefits, treatment plan, and alternative therapies with the patient.       DISPOSITION:  Patient will be discharged home in stable condition.    FOLLOW UP:  Your doctor as schedule    In 1 week  as scheduled    [facility] [address] [address]  [phone]    If you need a psychiatirc doctor doctor      OUTPATIENT MEDICATIONS:  Discharge Medication List as of 9/9/2019  9:43 PM      START taking these medications    Details   clonazePAM (KLONOPIN) 1 MG Tab Take 1 Tab by mouth at bedtime as needed for up to 10 days., Disp-10 Tab, R-0, Print Rx Paper, For 10 days             FINAL IMPRESSION  1. Insomnia due to other mental disorder    2. Medication refill          IDennis (Scribe), am scribing for, and in the presence of, Adriel Hernandez M.D.    Electronically signed by: Dennis Monzon (Kendraibe), 9/9/2019    IAdriel M.D. personally performed the services described in this documentation, as scribed by Dennis Monzon in my presence, and it is both accurate and complete.    E    The note accurately reflects work and decisions made by me.  Adriel Hernandez  9/9/2019  11:56 PM

## 2019-09-10 NOTE — ED NOTES
Pt given 1 Rx, educated on use and administration. Pt reminded to follow up with MD that prescribes him his medication. Pt states he has appointment on 9/18. Pt ambulatory to lobby.

## 2019-09-10 NOTE — ED TRIAGE NOTES
"C/o insomnia \"drug induced schizophrenia\" has appointment next week made during last visit. Pt ran out of medication..  Denies si/hi, a/v/t hallucinations.    "

## 2019-09-10 NOTE — DISCHARGE INSTRUCTIONS
No drinking or driving on Klonopin. Return if you have thoughts of hurting yourself, visual hallucination, or fever.

## 2020-08-29 ENCOUNTER — HOSPITAL ENCOUNTER (EMERGENCY)
Facility: MEDICAL CENTER | Age: 25
End: 2020-08-29
Attending: EMERGENCY MEDICINE
Payer: COMMERCIAL

## 2020-08-29 VITALS
OXYGEN SATURATION: 96 % | TEMPERATURE: 97.9 F | RESPIRATION RATE: 16 BRPM | SYSTOLIC BLOOD PRESSURE: 115 MMHG | BODY MASS INDEX: 30.31 KG/M2 | HEART RATE: 82 BPM | DIASTOLIC BLOOD PRESSURE: 70 MMHG | WEIGHT: 205.25 LBS

## 2020-08-29 DIAGNOSIS — K92.0 HEMATEMESIS WITH NAUSEA: ICD-10-CM

## 2020-08-29 DIAGNOSIS — K92.2 GASTROINTESTINAL HEMORRHAGE, UNSPECIFIED GASTROINTESTINAL HEMORRHAGE TYPE: ICD-10-CM

## 2020-08-29 LAB
ALBUMIN SERPL BCP-MCNC: 4.9 G/DL (ref 3.2–4.9)
ALBUMIN/GLOB SERPL: 2.2 G/DL
ALP SERPL-CCNC: 59 U/L (ref 30–99)
ALT SERPL-CCNC: 21 U/L (ref 2–50)
ANION GAP SERPL CALC-SCNC: 14 MMOL/L (ref 7–16)
AST SERPL-CCNC: 16 U/L (ref 12–45)
BASOPHILS # BLD AUTO: 0.9 % (ref 0–1.8)
BASOPHILS # BLD: 0.06 K/UL (ref 0–0.12)
BILIRUB SERPL-MCNC: 0.3 MG/DL (ref 0.1–1.5)
BUN SERPL-MCNC: 11 MG/DL (ref 8–22)
CALCIUM SERPL-MCNC: 9.5 MG/DL (ref 8.5–10.5)
CHLORIDE SERPL-SCNC: 102 MMOL/L (ref 96–112)
CO2 SERPL-SCNC: 23 MMOL/L (ref 20–33)
CREAT SERPL-MCNC: 1.12 MG/DL (ref 0.5–1.4)
EOSINOPHIL # BLD AUTO: 0.1 K/UL (ref 0–0.51)
EOSINOPHIL NFR BLD: 1.5 % (ref 0–6.9)
ERYTHROCYTE [DISTWIDTH] IN BLOOD BY AUTOMATED COUNT: 36.1 FL (ref 35.9–50)
GLOBULIN SER CALC-MCNC: 2.2 G/DL (ref 1.9–3.5)
GLUCOSE SERPL-MCNC: 94 MG/DL (ref 65–99)
HCT VFR BLD AUTO: 42.3 % (ref 42–52)
HGB BLD-MCNC: 15.2 G/DL (ref 14–18)
IMM GRANULOCYTES # BLD AUTO: 0.04 K/UL (ref 0–0.11)
IMM GRANULOCYTES NFR BLD AUTO: 0.6 % (ref 0–0.9)
INR PPP: 0.94 (ref 0.87–1.13)
LIPASE SERPL-CCNC: 33 U/L (ref 11–82)
LYMPHOCYTES # BLD AUTO: 1.98 K/UL (ref 1–4.8)
LYMPHOCYTES NFR BLD: 29.6 % (ref 22–41)
MCH RBC QN AUTO: 29.5 PG (ref 27–33)
MCHC RBC AUTO-ENTMCNC: 35.9 G/DL (ref 33.7–35.3)
MCV RBC AUTO: 82 FL (ref 81.4–97.8)
MONOCYTES # BLD AUTO: 0.48 K/UL (ref 0–0.85)
MONOCYTES NFR BLD AUTO: 7.2 % (ref 0–13.4)
NEUTROPHILS # BLD AUTO: 4.03 K/UL (ref 1.82–7.42)
NEUTROPHILS NFR BLD: 60.2 % (ref 44–72)
NRBC # BLD AUTO: 0 K/UL
NRBC BLD-RTO: 0 /100 WBC
PLATELET # BLD AUTO: 228 K/UL (ref 164–446)
PMV BLD AUTO: 9.1 FL (ref 9–12.9)
POTASSIUM SERPL-SCNC: 3.8 MMOL/L (ref 3.6–5.5)
PROT SERPL-MCNC: 7.1 G/DL (ref 6–8.2)
PROTHROMBIN TIME: 12.9 SEC (ref 12–14.6)
RBC # BLD AUTO: 5.16 M/UL (ref 4.7–6.1)
SODIUM SERPL-SCNC: 139 MMOL/L (ref 135–145)
WBC # BLD AUTO: 6.7 K/UL (ref 4.8–10.8)

## 2020-08-29 PROCEDURE — 83690 ASSAY OF LIPASE: CPT

## 2020-08-29 PROCEDURE — 85610 PROTHROMBIN TIME: CPT

## 2020-08-29 PROCEDURE — 85025 COMPLETE CBC W/AUTO DIFF WBC: CPT

## 2020-08-29 PROCEDURE — 700111 HCHG RX REV CODE 636 W/ 250 OVERRIDE (IP): Performed by: EMERGENCY MEDICINE

## 2020-08-29 PROCEDURE — 96374 THER/PROPH/DIAG INJ IV PUSH: CPT

## 2020-08-29 PROCEDURE — 99284 EMERGENCY DEPT VISIT MOD MDM: CPT

## 2020-08-29 PROCEDURE — 700105 HCHG RX REV CODE 258: Performed by: EMERGENCY MEDICINE

## 2020-08-29 PROCEDURE — 80053 COMPREHEN METABOLIC PANEL: CPT

## 2020-08-29 RX ORDER — ONDANSETRON 4 MG/1
4 TABLET, ORALLY DISINTEGRATING ORAL EVERY 8 HOURS PRN
Qty: 8 TAB | Refills: 0 | Status: SHIPPED | OUTPATIENT
Start: 2020-08-29 | End: 2020-09-05

## 2020-08-29 RX ORDER — SODIUM CHLORIDE 9 MG/ML
1000 INJECTION, SOLUTION INTRAVENOUS ONCE
Status: COMPLETED | OUTPATIENT
Start: 2020-08-29 | End: 2020-08-29

## 2020-08-29 RX ORDER — ONDANSETRON 2 MG/ML
4 INJECTION INTRAMUSCULAR; INTRAVENOUS ONCE
Status: DISCONTINUED | OUTPATIENT
Start: 2020-08-29 | End: 2020-08-29 | Stop reason: HOSPADM

## 2020-08-29 RX ORDER — OMEPRAZOLE 20 MG/1
20 CAPSULE, DELAYED RELEASE ORAL DAILY
Qty: 30 CAP | Refills: 0 | Status: SHIPPED | OUTPATIENT
Start: 2020-08-29

## 2020-08-29 RX ADMIN — SODIUM CHLORIDE 1000 ML: 9 INJECTION, SOLUTION INTRAVENOUS at 15:24

## 2020-08-29 RX ADMIN — FAMOTIDINE 20 MG: 10 INJECTION INTRAVENOUS at 15:24

## 2020-08-29 SDOH — HEALTH STABILITY: MENTAL HEALTH: HOW OFTEN DO YOU HAVE A DRINK CONTAINING ALCOHOL?: 2-3 TIMES A WEEK

## 2020-08-29 NOTE — ED PROVIDER NOTES
"ED Provider Note    Scribed for Chris Marshall M.D. by Matt Rome. 8/29/2020  3:02 PM    Primary care provider: Pcp Not In Computer  Means of arrival: walk-in  History obtained from: patient  History limited by: none    CHIEF COMPLAINT  Chief Complaint   Patient presents with   • Vomiting   • Blood in Vomit   • Low Back Pain       HPI  Bert Zaheer Mora is a 25 y.o. male who presents to the Emergency Department with concerns for multiple episodes of mild-moderate hematemesis for the past 1 week. He describes it as \"fruit-punch\" looking. He has had 2 episodes today, both after eating or drinking. He was previously able to tolerate PO intake well. He states that he took Aleve x2 tabs last week, but he has not otherwise taken any NSAID's. He drinks alcohol on occasion, and he has not had any increase recently. He denies any melena or hematochezia. He states that he has been having worsening acid reflux for the past month. He has been taking Ranitidine once or twice per day for about the past 2 years. He has not had a prior EGD. He states that he had a colonoscopy in [place] about 6 years ago. He has separate complaints of bilateral lower back pain as well.     REVIEW OF SYSTEMS  Pertinent positives include: hematemesis and heartburn. Pertinent negatives include: melena or hematochezia. See history of present illness. All other systems are negative.     PAST MEDICAL HISTORY   has a past medical history of Psychosis (HCC).    SURGICAL HISTORY   has a past surgical history that includes other orthopedic surgery (2009).    SOCIAL HISTORY  Social History     Tobacco Use   • Smoking status: Former Smoker     Packs/day: 1.00     Types: Cigarettes   • Smokeless tobacco: Never Used   Substance Use Topics   • Alcohol use: Yes     Frequency: 2-3 times a week   • Drug use: No      Social History     Substance and Sexual Activity   Drug Use No       FAMILY HISTORY  History reviewed. No pertinent family " history.    CURRENT MEDICATIONS  Home Medications     Reviewed by Leonel Rosario R.N. (Registered Nurse) on 08/29/20 at 1447  Med List Status: Partial   Medication Last Dose Status   Divalproex Sodium (DEPAKOTE PO)  Active   OLANZapine (ZYPREXA PO)  Active   oxybutynin (DITROPAN) 5 MG Tab  Active                ALLERGIES  No Known Allergies    PHYSICAL EXAM  VITAL SIGNS: /75   Pulse 92   Temp 36.6 °C (97.9 °F) (Temporal)   Resp 16   Wt 93.1 kg (205 lb 4 oz)   SpO2 97%   BMI 30.31 kg/m²     Constitutional: Alert in no apparent distress.  HENT: No signs of trauma, Bilateral external ears normal, Nose normal. Uvula midline.   Eyes: Pupils are equal and reactive, Conjunctiva normal, Non-icteric.   Neck: Normal range of motion, No tenderness, Supple, No stridor.   Lymphatic: No lymphadenopathy noted.   Cardiovascular: Regular rate and rhythm, no murmurs.   Thorax & Lungs: Normal breath sounds, No respiratory distress, No wheezing, No chest tenderness.   Abdomen:  Soft, No tenderness, No peritoneal signs, No masses, No pulsatile masses.   Rectal: Deferred per patient.  Skin: Warm, Dry, No erythema, No rash.   Back: No bony tenderness, No CVA tenderness.   Extremities: Intact distal pulses, No edema, No tenderness, No cyanosis.  Musculoskeletal: Good range of motion in all major joints. No tenderness to palpation or major deformities noted.   Neurologic: Alert , Normal motor function, Normal sensory function, No focal deficits noted.   Psychiatric: Affect normal, Judgment normal, Mood normal.     DIAGNOSTIC STUDIES / PROCEDURES    LABS  Labs Reviewed   CBC WITH DIFFERENTIAL - Abnormal; Notable for the following components:       Result Value    MCHC 35.9 (*)     All other components within normal limits    Narrative:     Indicate which anticoagulants the patient is on:->NONE   COMP METABOLIC PANEL    Narrative:     Indicate which anticoagulants the patient is on:->NONE   LIPASE    Narrative:     Indicate  which anticoagulants the patient is on:->NONE   PROTHROMBIN TIME    Narrative:     Indicate which anticoagulants the patient is on:->NONE   ESTIMATED GFR    Narrative:     Indicate which anticoagulants the patient is on:->NONE      All labs reviewed by me.    COURSE & MEDICAL DECISION MAKING  Nursing notes, VS PMSFHx reviewed in chart.    25 y.o. male p/w chief complaint of hematemesis with nausea for the past week.    3:02 PM Patient seen and examined at bedside.      I verified that the patient was wearing a mask and I was wearing appropriate PPE every time I entered the room. The patient's mask was on the patient at all times during my encounter except for a brief view of the oropharynx.     The differential diagnoses include but are not limited to:     #hematemesis  Patient's symptoms most indicative of gastritis/PUD.  Patient elected to differ a rectal examination today given that he has not seen any gross blood or melena in his stool which I believe is reasonable.  Pt is has a normal HR, is normotensive, and is displaying no e/o hemorraghic shock.  Pt given empiric rx of PPI if etiology PUD.  No prior hx of variceal bleed.    CBC w/ no e/o  acute blood loss anemia, and no significant thrombocytopenia.  CMP w/ no e/o significant uremia  LFTs within normal limits.   INR w/ no e/o coagulopathy.      4:19 PM - Patient was reevaluated at bedside. He states that he is feeling improved at this time. Discussed lab and radiology results with the patient and informed them of the plan for discharge.  Discussed the importance of establishing with a PCP for further evaluation and treatment. I advised against any NSAID or alcohol use. I recommended that he stop using the Ranitidine and switch to Omeprazole 20 mg QD. I have provided prescriptions for Omeprazole as well as Zofran. Patient verbalizes understanding and agreement to this plan of care.      HYDRATION: Based on the patient's presentation of Acute Vomiting the  patient was given IV fluids. IV Hydration was used because oral hydration was not adequate alone. Upon recheck following hydration, the patient was improved.    The patient will return for new or worsening symptoms and is stable at the time of discharge.    The patient is referred to a primary physician for blood pressure management, diabetic screening, and for all other preventative health concerns.    DISPOSITION:  Patient will be discharged home in stable condition.    FOLLOW UP:  [facility], Emergency Dept  [address] [id number] [phone]    If symptoms worsen    [facility]  [address] [place] [id number]  [phone]  In 1 week      [facility]  [address] [facility] [address] [phone]  In 1 week  Please call to schedule an appointment for follow up      OUTPATIENT MEDICATIONS:  Discharge Medication List as of 8/29/2020  4:30 PM      START taking these medications    Details   ondansetron (ZOFRAN ODT) 4 MG TABLET DISPERSIBLE Take 1 Tab by mouth every 8 hours as needed for Nausea for up to 7 days., Disp-8 Tab,R-0, Normal      omeprazole (PRILOSEC) 20 MG delayed-release capsule Take 1 Cap by mouth every day., Disp-30 Cap,R-0, Normal              FINAL IMPRESSION  1. Gastrointestinal hemorrhage, unspecified gastrointestinal hemorrhage type    2. Hematemesis with nausea          Matt WOOD (Scrbenitez), am scribing for, and in the presence of, Chris Marshall M.D..    Electronically signed by: Matt Rome (Anne), 8/29/2020    IChris M.D. personally performed the services described in this documentation, as scribed by Matt Rome in my presence, and it is both accurate and complete.    The note accurately reflects work and decisions made by me.  Chris Marshall M.D.  8/29/2020  10:30 PM

## 2020-08-29 NOTE — ED NOTES
Discharge instructions provided with Rx x 2 sent to pharmacy.  Verbalized understanding of follow-up care, medication management & reasons to return to ED.  Released in stable condition.

## 2020-08-29 NOTE — ED TRIAGE NOTES
26 y/o male ambulatory to triage with c/o vomiting up blood x 1.5 weeks intermittently. He states since yesterday the vomiting has increased in frequency, he states he is vomiting bright red blood. Also c/o low back pain but is unsure if the pain is related.

## 2020-12-27 ENCOUNTER — APPOINTMENT (OUTPATIENT)
Dept: RADIOLOGY | Facility: MEDICAL CENTER | Age: 25
End: 2020-12-27
Attending: EMERGENCY MEDICINE
Payer: COMMERCIAL

## 2020-12-27 ENCOUNTER — HOSPITAL ENCOUNTER (EMERGENCY)
Facility: MEDICAL CENTER | Age: 25
End: 2020-12-27
Attending: EMERGENCY MEDICINE
Payer: COMMERCIAL

## 2020-12-27 VITALS
RESPIRATION RATE: 18 BRPM | SYSTOLIC BLOOD PRESSURE: 126 MMHG | HEIGHT: 69 IN | WEIGHT: 187.39 LBS | TEMPERATURE: 97.6 F | OXYGEN SATURATION: 94 % | HEART RATE: 64 BPM | DIASTOLIC BLOOD PRESSURE: 90 MMHG | BODY MASS INDEX: 27.76 KG/M2
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
## 2020-12-27 DIAGNOSIS — S70.01XA CONTUSION OF RIGHT HIP AND THIGH, INITIAL ENCOUNTER: ICD-10-CM

## 2020-12-27 DIAGNOSIS — S70.11XA CONTUSION OF RIGHT HIP AND THIGH, INITIAL ENCOUNTER: ICD-10-CM

## 2020-12-27 DIAGNOSIS — R30.0 DYSURIA: ICD-10-CM

## 2020-12-27 LAB
APPEARANCE UR: CLEAR
BILIRUB UR QL STRIP.AUTO: NEGATIVE
COLOR UR: YELLOW
GLUCOSE UR STRIP.AUTO-MCNC: NEGATIVE MG/DL
KETONES UR STRIP.AUTO-MCNC: NEGATIVE MG/DL
LEUKOCYTE ESTERASE UR QL STRIP.AUTO: NEGATIVE
MICRO URNS: NORMAL
NITRITE UR QL STRIP.AUTO: NEGATIVE
PH UR STRIP.AUTO: 6 [PH] (ref 5–8)
PROT UR QL STRIP: NEGATIVE MG/DL
RBC UR QL AUTO: NEGATIVE
SP GR UR STRIP.AUTO: 1.02
UROBILINOGEN UR STRIP.AUTO-MCNC: 0.2 MG/DL

## 2020-12-27 PROCEDURE — 81003 URINALYSIS AUTO W/O SCOPE: CPT

## 2020-12-27 PROCEDURE — 72192 CT PELVIS W/O DYE: CPT

## 2020-12-27 PROCEDURE — 99284 EMERGENCY DEPT VISIT MOD MDM: CPT

## 2020-12-27 RX ORDER — HYDROXYZINE HYDROCHLORIDE 25 MG/1
75 TABLET, FILM COATED ORAL NIGHTLY PRN
COMMUNITY

## 2020-12-27 RX ORDER — HALOPERIDOL 5 MG/1
5 TABLET ORAL DAILY
COMMUNITY

## 2020-12-27 RX ORDER — PHENAZOPYRIDINE HYDROCHLORIDE 200 MG/1
200 TABLET, FILM COATED ORAL 3 TIMES DAILY PRN
Qty: 6 TAB | Refills: 0 | Status: SHIPPED | OUTPATIENT
Start: 2020-12-27

## 2020-12-27 SDOH — HEALTH STABILITY: MENTAL HEALTH: HOW OFTEN DO YOU HAVE 6 OR MORE DRINKS ON ONE OCCASION?: MONTHLY

## 2020-12-27 SDOH — HEALTH STABILITY: MENTAL HEALTH: HOW OFTEN DO YOU HAVE A DRINK CONTAINING ALCOHOL?: 4 OR MORE TIMES A WEEK

## 2020-12-27 SDOH — HEALTH STABILITY: MENTAL HEALTH: HOW MANY STANDARD DRINKS CONTAINING ALCOHOL DO YOU HAVE ON A TYPICAL DAY?: 1 OR 2

## 2020-12-27 ASSESSMENT — PAIN DESCRIPTION - DESCRIPTORS: DESCRIPTORS: BURNING;THROBBING

## 2020-12-27 ASSESSMENT — FIBROSIS 4 INDEX: FIB4 SCORE: 0.41

## 2020-12-27 ASSESSMENT — PAIN DESCRIPTION - PAIN TYPE: TYPE: ACUTE PAIN
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
## 2020-12-28 NOTE — ED NOTES
"Pt discharged home, A&Ox4, steady gait, pt in possession of belongings. Pt provided discharge education and information pertaining to medications and follow up appointments. Pt received copy of discharge instructions and verbalized understanding. /90   Pulse 64   Temp 36.4 °C (97.6 °F) (Temporal)   Resp 18   Ht 1.753 m (5' 9\")   Wt 85 kg (187 lb 6.3 oz)   SpO2 94%   BMI 27.67 kg/m²   "